# Patient Record
Sex: MALE | URBAN - METROPOLITAN AREA
[De-identification: names, ages, dates, MRNs, and addresses within clinical notes are randomized per-mention and may not be internally consistent; named-entity substitution may affect disease eponyms.]

---

## 2022-02-03 ENCOUNTER — OFFICE VISIT (OUTPATIENT)
Dept: SURGERY | Age: 27
End: 2022-02-03
Payer: COMMERCIAL

## 2022-02-03 VITALS
WEIGHT: 201.4 LBS | OXYGEN SATURATION: 98 % | BODY MASS INDEX: 27.28 KG/M2 | SYSTOLIC BLOOD PRESSURE: 112 MMHG | TEMPERATURE: 97.3 F | HEIGHT: 72 IN | DIASTOLIC BLOOD PRESSURE: 66 MMHG | HEART RATE: 90 BPM

## 2022-02-03 DIAGNOSIS — R22.1 NECK MASS: Primary | ICD-10-CM

## 2022-02-03 PROCEDURE — 99204 OFFICE O/P NEW MOD 45 MIN: CPT | Performed by: SURGERY

## 2022-02-03 NOTE — PROGRESS NOTES
General Surgery Consult    Hammad Damian  Admit date: (Not on file)    MRN: [id number]     : 1995     Age: 31 y.o. Attending Physician: Armando Cevallos MD, [facility]      History of Present Illness:      Hammad Damian is a 31 y.o. male who was referred to me by Dr. Florence Baeza for evaluation of a neck mass. The patient stated that this mass has been present for about 1 year and he believes that it happened after shaving. He stated that it has never been infected but he would like it to be removed. He was supposed to be seen by a surgeon at [facility] but they did not take his insurance so he was referred to us. He currently denies any pain or drainage. He denies any fever or chills. There are no problems to display for this patient. History reviewed. No pertinent past medical history. History reviewed. No pertinent surgical history. Social History     Tobacco Use    Smoking status: Never Smoker    Smokeless tobacco: Never Used   Substance Use Topics    Alcohol use: Never      Social History     Tobacco Use   Smoking Status Never Smoker   Smokeless Tobacco Never Used     History reviewed. No pertinent family history. No current outpatient medications on file. No current facility-administered medications for this visit. No Known Allergies       Review of Systems:  Pertinent items are noted in the History of Present Illness. Objective:     Visit Vitals  /73 (BP 1 Location: Right arm, BP Patient Position: Sitting, BP Cuff Size: Large adult)   Pulse 96   Temp 98.1 °F (36.7 °C) (Temporal)   Ht 6' (1.829 m)   Wt 91.4 kg (201 lb 6.4 oz)   SpO2 98%   BMI 27.31 kg/m²       Physical Exam:      General:  in no apparent distress, alert, oriented times 3 and afebrile   Eyes:  conjunctivae and sclerae normal, pupils equal, round, reactive to light   Throat & Neck:  There is a soft tissue mass in the posterior neck at the level of C4-C5 that is about 1 cm in size.   It is slightly attached to the skin consistent with a sebaceous cyst.  There is no overlying skin changes. It is nontender. Imaging and Lab Review:     CBC: No results found for: WBC, RBC, HGB, HCT, PLT, HGBEXT, HCTEXT, PLTEXT  BMP: No results found for: GLU, NA, K, CL, CO2, BUN, CREA, CA  CMP:No results found for: GLU, NA, K, CL, CO2, BUN, CREA, CA, AGAP, BUCR, TBIL, AP, TP, ALB, GLOB, AGRAT    No results found for this or any previous visit (from the past 24 hour(s)). images and reports reviewed    Assessment:   Teodora Cabrera is a 31 y.o. male who has a posterior neck mass that has been present for a year that most likely represent a sebaceous cyst.  However the patient stated that this happened after shaving and he would like it to be removed. He stated that he is okay with having it removed under local anesthesia in the office today which we will do. I explained to him the risks of the procedure including bleeding and infection and the need to keep the wound open.      Plan:     A consent was taken from the patient  The area around the neck was infiltrated with 1% lidocaine  A skin incision about 1 cm was performed deepened through skin and subcutaneous tissue  There was a sebaceous cyst that was easily removed and discarded  Hemostasis was secured with pressure  The skin was closed with 4-0 Monocryl and glue    Please call me if you have any questions (cell phone: [phone])     Signed By: Stefan Greene MD     February 3, 2022

## 2022-02-03 NOTE — PROGRESS NOTES
Emmett Sawyer is a 31 y.o. male (: 1995) presenting to address:    Patient tolerated procedure well. Chief Complaint   Patient presents with    New Patient     paoterior neck mass x 1 year/ referred by Dr Tiffanie Do       Medication list and allergies have been reviewed with Emmett Sawyer and updated as of today's date. I have gone over all Medical, Surgical and Social History with Emmett Sawyer and updated/added the information accordingly.

## 2022-02-22 ENCOUNTER — OFFICE VISIT (OUTPATIENT)
Dept: SURGERY | Age: 27
End: 2022-02-22
Payer: COMMERCIAL

## 2022-02-22 VITALS
DIASTOLIC BLOOD PRESSURE: 70 MMHG | OXYGEN SATURATION: 98 % | HEIGHT: 72 IN | SYSTOLIC BLOOD PRESSURE: 111 MMHG | BODY MASS INDEX: 27.82 KG/M2 | HEART RATE: 64 BPM | TEMPERATURE: 97.4 F | WEIGHT: 205.4 LBS

## 2022-02-22 DIAGNOSIS — Z09 POSTOPERATIVE EXAMINATION: Primary | ICD-10-CM

## 2022-02-22 PROCEDURE — 99024 POSTOP FOLLOW-UP VISIT: CPT | Performed by: SURGERY

## 2022-02-22 NOTE — PROGRESS NOTES
Patient seen and examined. He is doing well. The incision on his neck at the site of the previous sebaceous cyst has healed completely. Follow-up as needed.

## 2022-02-22 NOTE — PROGRESS NOTES
Indira Carrillo is a 31 y.o. male (: 1995) presenting to address:    Chief Complaint   Patient presents with    Surgical Follow-up     Excision of sebaceous cyst of psoterior neck 22       Medication list and allergies have been reviewed with Indira Carrillo and updated as of today's date. I have gone over all Medical, Surgical and Social History with Indira Carrillo and updated/added the information accordingly. 1. Have you been to the ER, Urgent Care or Hospitalized since your last visit? NO      2. Have you followed up with your PCP or any other Physicians since your procedure/ last office visit?    NO

## 2024-12-26 ENCOUNTER — TELEPHONE (OUTPATIENT)
Age: 29
End: 2024-12-26

## 2024-12-26 NOTE — TELEPHONE ENCOUNTER
Mr. 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 Phillips in 2022 and do not require any further evaluation or treatment.  Additionally Mr. Croft request letter to be emailed to [email or web address]  I explained Dr. Phillips will not return to the office until Monday, December 30, 2024 at which time I could obtain his signature and email the letter. Mr. Croft verbalized understanding.